# Patient Record
Sex: FEMALE | Race: WHITE | NOT HISPANIC OR LATINO
[De-identification: names, ages, dates, MRNs, and addresses within clinical notes are randomized per-mention and may not be internally consistent; named-entity substitution may affect disease eponyms.]

---

## 2020-01-22 ENCOUNTER — APPOINTMENT (OUTPATIENT)
Dept: HEMATOLOGY ONCOLOGY | Facility: CLINIC | Age: 60
End: 2020-01-22
Payer: COMMERCIAL

## 2020-01-22 ENCOUNTER — LABORATORY RESULT (OUTPATIENT)
Age: 60
End: 2020-01-22

## 2020-01-22 VITALS
HEART RATE: 79 BPM | WEIGHT: 140 LBS | DIASTOLIC BLOOD PRESSURE: 76 MMHG | TEMPERATURE: 98 F | SYSTOLIC BLOOD PRESSURE: 153 MMHG | HEIGHT: 60 IN | RESPIRATION RATE: 16 BRPM | BODY MASS INDEX: 27.48 KG/M2

## 2020-01-22 DIAGNOSIS — F17.200 NICOTINE DEPENDENCE, UNSPECIFIED, UNCOMPLICATED: ICD-10-CM

## 2020-01-22 DIAGNOSIS — I10 ESSENTIAL (PRIMARY) HYPERTENSION: ICD-10-CM

## 2020-01-22 DIAGNOSIS — F41.9 ANXIETY DISORDER, UNSPECIFIED: ICD-10-CM

## 2020-01-22 DIAGNOSIS — Z82.49 FAMILY HISTORY OF ISCHEMIC HEART DISEASE AND OTHER DISEASES OF THE CIRCULATORY SYSTEM: ICD-10-CM

## 2020-01-22 DIAGNOSIS — Z83.3 FAMILY HISTORY OF DIABETES MELLITUS: ICD-10-CM

## 2020-01-22 DIAGNOSIS — E78.5 HYPERLIPIDEMIA, UNSPECIFIED: ICD-10-CM

## 2020-01-22 LAB
HCT VFR BLD CALC: 41.3 %
HGB BLD-MCNC: 13.8 G/DL
IRON SATN MFR SERPL: 25 %
IRON SERPL-MCNC: 84 UG/DL
MCHC RBC-ENTMCNC: 27.9 PG
MCHC RBC-ENTMCNC: 33.4 G/DL
MCV RBC AUTO: 83.4 FL
PLATELET # BLD AUTO: 484 K/UL
PMV BLD: 8.7 FL
RBC # BLD: 4.95 M/UL
RBC # FLD: 13.9 %
RETICS # AUTO: 1 %
RETICS AGGREG/RBC NFR: 48.5 K/UL
TIBC SERPL-MCNC: 331 UG/DL
UIBC SERPL-MCNC: 247 UG/DL
WBC # FLD AUTO: 11.06 K/UL

## 2020-01-22 PROCEDURE — 99205 OFFICE O/P NEW HI 60 MIN: CPT

## 2020-01-22 RX ORDER — CHLORTHALIDONE 25 MG/1
25 TABLET ORAL
Refills: 0 | Status: ACTIVE | COMMUNITY

## 2020-01-22 RX ORDER — LOSARTAN POTASSIUM AND HYDROCHLOROTHIAZIDE 100; 25 MG/1; MG/1
100-25 TABLET, FILM COATED ORAL
Refills: 0 | Status: ACTIVE | COMMUNITY

## 2020-01-22 RX ORDER — ATORVASTATIN CALCIUM 20 MG/1
20 TABLET, FILM COATED ORAL
Refills: 0 | Status: ACTIVE | COMMUNITY

## 2020-01-22 RX ORDER — AMLODIPINE BESYLATE AND BENAZEPRIL HYDROCHLORIDE 10; 20 MG/1; MG/1
10-20 CAPSULE ORAL
Refills: 0 | Status: ACTIVE | COMMUNITY

## 2020-01-23 LAB — FERRITIN SERPL-MCNC: 98 NG/ML

## 2020-01-23 NOTE — ASSESSMENT
[FreeTextEntry1] : 60 yo woman with Thrombocytosis at 500K stable since at least 2009; no evidence of bleeding or thrombosis; no leukocytosis or erythrocytosis\par - CBC, iron studies, ferritin, retic count, BCR-ABL, JAK2/MPL/CALR testing today\par - US Abd ordered to r/o fatty liver \par \par Requested labwork (CBC) dated prior to 2018 for evaluation.\par \par RTC in 1 week

## 2020-01-23 NOTE — REVIEW OF SYSTEMS
[Negative] : Allergic/Immunologic [Chills] : no chills [Fever] : no fever [Shortness Of Breath] : no shortness of breath [Chest Pain] : no chest pain [Vision Problems] : no vision problems [Abdominal Pain] : no abdominal pain [Constipation] : no constipation [Diarrhea] : no diarrhea

## 2020-01-23 NOTE — CONSULT LETTER
[Dear  ___] : Dear  [unfilled], [Sincerely,] : Sincerely, [Please see my note below.] : Please see my note below. [Consult Closing:] : Thank you very much for allowing me to participate in the care of this patient.  If you have any questions, please do not hesitate to contact me. [Consult Letter:] : I had the pleasure of evaluating your patient, [unfilled]. [FreeTextEntry3] : Ab Gramajo DO\par Attending Physician,\par Hematology/ Medical Oncology\par [phone] office\par \par  [FreeTextEntry2] : David Kraft Dr

## 2020-01-23 NOTE — HISTORY OF PRESENT ILLNESS
[de-identified] : Ms. Siena Bagley is a 59 year old female here today for evaluation of high PLTs.\par \par She was referred by Dr. Kraft.  Patient is a 58yo F with PMH including htn and anxiety who presents for thrombocytosis.  She states she has been evaluated in their office for about 15 years but this is the first time she was made aware of the elevated PLTs.  Patient denies any new headaches, CP, pruritus, changes in vision or bleeding.  \par \par LAB WORKUP\par (12.7.2019) WBC 8.4, Hgb 14.4, MCV 84.7, RDW 13.1, , , HDL 67, normal CMP,  A1c 5.4%\par (03.02.2018) WBC 7.8, Hgb 14.1, MCV 86.2, RDW 14, \par \par reviewed the labs from 2009: platelets count was 484K AND no anemia or leukopenia

## 2020-01-27 LAB — GENE XXX MUT ANL BLD/T: NORMAL

## 2020-01-28 LAB — T(9;22)(ABL1,BCR)/CONTROL BLD/T: NORMAL

## 2020-01-30 LAB
COMMENT: NORMAL
MPL P.S505N BLD/T QL: NORMAL
MPL P.W515 BLD/T QL: NORMAL
MPL SOURCE: NOT DETECTED

## 2020-01-31 ENCOUNTER — APPOINTMENT (OUTPATIENT)
Dept: HEMATOLOGY ONCOLOGY | Facility: CLINIC | Age: 60
End: 2020-01-31
Payer: MEDICAID

## 2020-01-31 VITALS
WEIGHT: 143 LBS | HEART RATE: 78 BPM | BODY MASS INDEX: 26.31 KG/M2 | HEIGHT: 62 IN | TEMPERATURE: 97.6 F | SYSTOLIC BLOOD PRESSURE: 137 MMHG | RESPIRATION RATE: 16 BRPM | DIASTOLIC BLOOD PRESSURE: 64 MMHG

## 2020-01-31 PROCEDURE — 99214 OFFICE O/P EST MOD 30 MIN: CPT

## 2020-01-31 NOTE — HISTORY OF PRESENT ILLNESS
[de-identified] : Ms. Siena Bagley is a 59 year old female here today for evaluation of high PLTs.\par \par She was referred by Dr. Kraft.  Patient is a 58yo F with PMH including htn and anxiety who presents for thrombocytosis.  She states she has been evaluated in their office for about 15 years but this is the first time she was made aware of the elevated PLTs.  Patient denies any new headaches, CP, pruritus, changes in vision or bleeding.  \par \par LAB WORKUP\par (12.7.2019) WBC 8.4, Hgb 14.4, MCV 84.7, RDW 13.1, , , HDL 67, normal CMP,  A1c 5.4%\par (03.02.2018) WBC 7.8, Hgb 14.1, MCV 86.2, RDW 14, \par \par reviewed the labs from 2009: platelets count was 484K AND no anemia or leukopenia [de-identified] : 1/31/2020\par

## 2020-01-31 NOTE — CONSULT LETTER
[Dear  ___] : Dear  [unfilled], [Consult Letter:] : I had the pleasure of evaluating your patient, [unfilled]. [Please see my note below.] : Please see my note below. [Consult Closing:] : Thank you very much for allowing me to participate in the care of this patient.  If you have any questions, please do not hesitate to contact me. [Sincerely,] : Sincerely, [FreeTextEntry2] : David Kraft Dr [FreeTextEntry3] : Ab Gramajo DO\par Attending Physician,\par Hematology/ Medical Oncology\par [phone] office\par \par

## 2020-01-31 NOTE — ASSESSMENT
[FreeTextEntry1] : 60 yo woman with Thrombocytosis at 500K stable since at least 2009; no evidence of bleeding or thrombosis; no leukocytosis or erythrocytosis- \par bcr abl not detected\par calr and jak2 neg\par MPL pending\par - US Abd ordered to r/o fatty liver pending\par \par there is a possibility of triple negative (JAK2/ MPL/ CALR) Essential thrombocytosis; if that is the case , she has intermidiate risk of progression to leukemia, but does not yet require hydrea; will keep monitoring levels; start ASA; will get vWD w/u\par \par no iron deficiency to explain thrombocytosis\par \par Will try to r/o MDS w/ 5q deletion by blood w/u; otherwise will consider bone marrow\par \par RTC in 4  weeks

## 2020-01-31 NOTE — REVIEW OF SYSTEMS
[Negative] : Allergic/Immunologic [Fever] : no fever [Chills] : no chills [Vision Problems] : no vision problems [Chest Pain] : no chest pain [Shortness Of Breath] : no shortness of breath [Abdominal Pain] : no abdominal pain [Constipation] : no constipation [Diarrhea] : no diarrhea

## 2020-02-03 LAB — VWF AG PPP IA-ACNC: 100 %

## 2020-02-12 LAB
VWF MULTIMERS PPP IA-ACNC: NORMAL
VWF:RCO ACT/NOR PPP PL AGG: 82 %

## 2020-02-21 ENCOUNTER — APPOINTMENT (OUTPATIENT)
Dept: HEMATOLOGY ONCOLOGY | Facility: CLINIC | Age: 60
End: 2020-02-21
Payer: MEDICAID

## 2020-02-21 ENCOUNTER — OUTPATIENT (OUTPATIENT)
Dept: OUTPATIENT SERVICES | Facility: HOSPITAL | Age: 60
LOS: 1 days | Discharge: HOME | End: 2020-02-21

## 2020-02-21 ENCOUNTER — LABORATORY RESULT (OUTPATIENT)
Age: 60
End: 2020-02-21

## 2020-02-21 VITALS
SYSTOLIC BLOOD PRESSURE: 147 MMHG | BODY MASS INDEX: 26.68 KG/M2 | HEIGHT: 62 IN | WEIGHT: 145 LBS | HEART RATE: 72 BPM | DIASTOLIC BLOOD PRESSURE: 73 MMHG | TEMPERATURE: 97.6 F

## 2020-02-21 DIAGNOSIS — D47.3 ESSENTIAL (HEMORRHAGIC) THROMBOCYTHEMIA: ICD-10-CM

## 2020-02-21 LAB
HCT VFR BLD CALC: 38.1 %
HGB BLD-MCNC: 13 G/DL
MCHC RBC-ENTMCNC: 28.7 PG
MCHC RBC-ENTMCNC: 34.1 G/DL
MCV RBC AUTO: 84.1 FL
PLATELET # BLD AUTO: 413 K/UL
PMV BLD: 9.2 FL
RBC # BLD: 4.53 M/UL
RBC # FLD: 14.1 %
WBC # FLD AUTO: 13.01 K/UL

## 2020-02-21 PROCEDURE — 99213 OFFICE O/P EST LOW 20 MIN: CPT

## 2020-02-21 NOTE — REVIEW OF SYSTEMS
[Negative] : Heme/Lymph [Fever] : no fever [Chills] : no chills [Vision Problems] : no vision problems [Chest Pain] : no chest pain [Shortness Of Breath] : no shortness of breath [Abdominal Pain] : no abdominal pain [Constipation] : no constipation [Diarrhea] : no diarrhea

## 2020-02-21 NOTE — HISTORY OF PRESENT ILLNESS
[de-identified] : Ms. Siena Bagley is a 59 year old female here today for evaluation of high PLTs.\par \par She was referred by Dr. Kraft.  Patient is a 60yo F with PMH including htn and anxiety who presents for thrombocytosis.  She states she has been evaluated in their office for about 15 years but this is the first time she was made aware of the elevated PLTs.  Patient denies any new headaches, CP, pruritus, changes in vision or bleeding.  \par \par LAB WORKUP\par (12.7.2019) WBC 8.4, Hgb 14.4, MCV 84.7, RDW 13.1, , , HDL 67, normal CMP,  A1c 5.4%\par (03.02.2018) WBC 7.8, Hgb 14.1, MCV 86.2, RDW 14, \par \par reviewed the labs from 2009: platelets count was 484K AND no anemia or leukopenia [de-identified] : 2/21/2020\par Patient is here for a follow-up visit for thrombocytosis.  She is feeling well with no new complaints.  Reviewed most recent CBC shows persistent leukocytosis at 13.01 and PLTs at 413,000 from today.  Patient denies fever, chills, nausea, vomiting, new CP or bleeding.  US Abd was not yet performed.  She takes baby ASA daily.

## 2020-02-21 NOTE — CONSULT LETTER
[Consult Letter:] : I had the pleasure of evaluating your patient, [unfilled]. [Dear  ___] : Dear  [unfilled], [Please see my note below.] : Please see my note below. [Sincerely,] : Sincerely, [Consult Closing:] : Thank you very much for allowing me to participate in the care of this patient.  If you have any questions, please do not hesitate to contact me. [FreeTextEntry2] : David Kraft Dr [FreeTextEntry3] : Ab Gramajo DO\par Attending Physician,\par Hematology/ Medical Oncology\par [phone] office\par \par

## 2020-02-24 ENCOUNTER — LABORATORY RESULT (OUTPATIENT)
Age: 60
End: 2020-02-24

## 2020-02-29 ENCOUNTER — FORM ENCOUNTER (OUTPATIENT)
Age: 60
End: 2020-02-29

## 2020-03-01 ENCOUNTER — OUTPATIENT (OUTPATIENT)
Dept: OUTPATIENT SERVICES | Facility: HOSPITAL | Age: 60
LOS: 1 days | Discharge: HOME | End: 2020-03-01
Payer: COMMERCIAL

## 2020-03-01 DIAGNOSIS — D47.3 ESSENTIAL (HEMORRHAGIC) THROMBOCYTHEMIA: ICD-10-CM

## 2020-03-01 DIAGNOSIS — R10.9 UNSPECIFIED ABDOMINAL PAIN: ICD-10-CM

## 2020-03-01 PROCEDURE — 76700 US EXAM ABDOM COMPLETE: CPT | Mod: 26

## 2020-04-22 ENCOUNTER — APPOINTMENT (OUTPATIENT)
Dept: HEMATOLOGY ONCOLOGY | Facility: CLINIC | Age: 60
End: 2020-04-22

## 2020-04-23 ENCOUNTER — APPOINTMENT (OUTPATIENT)
Dept: HEMATOLOGY ONCOLOGY | Facility: CLINIC | Age: 60
End: 2020-04-23
Payer: MEDICAID

## 2020-04-23 PROCEDURE — 99213 OFFICE O/P EST LOW 20 MIN: CPT | Mod: 95

## 2020-04-23 NOTE — REVIEW OF SYSTEMS
[Negative] : Allergic/Immunologic [Fever] : no fever [Chills] : no chills [Chest Pain] : no chest pain [Vision Problems] : no vision problems [Abdominal Pain] : no abdominal pain [Constipation] : no constipation [Shortness Of Breath] : no shortness of breath [Diarrhea] : no diarrhea

## 2020-04-23 NOTE — PHYSICAL EXAM
[Fully active, able to carry on all pre-disease performance without restriction] : Status 0 - Fully active, able to carry on all pre-disease performance without restriction [Normal] : grossly intact [de-identified] : telehealth visit; daughter is present

## 2020-04-23 NOTE — HISTORY OF PRESENT ILLNESS
[de-identified] : Ms. Siena Bagley is a 59 year old female here today for evaluation of high PLTs.\par \par She was referred by Dr. Kraft.  Patient is a 58yo F with PMH including htn and anxiety who presents for thrombocytosis.  She states she has been evaluated in their office for about 15 years but this is the first time she was made aware of the elevated PLTs.  Patient denies any new headaches, CP, pruritus, changes in vision or bleeding.  \par \par LAB WORKUP\par (12.7.2019) WBC 8.4, Hgb 14.4, MCV 84.7, RDW 13.1, , , HDL 67, normal CMP,  A1c 5.4%\par (03.02.2018) WBC 7.8, Hgb 14.1, MCV 86.2, RDW 14, \par \par reviewed the labs from 2009: platelets count was 484K AND no anemia or leukopenia [de-identified] : 2/21/2020\par Patient is here for a follow-up visit for thrombocytosis.  She is feeling well with no new complaints.  Reviewed most recent CBC shows persistent leukocytosis at 13.01 and PLTs at 413,000 from today.  Patient denies fever, chills, nausea, vomiting, new CP or bleeding.  US Abd was not yet performed.  She takes baby ASA daily.

## 2020-04-23 NOTE — ASSESSMENT
[FreeTextEntry1] : TELEHEALTH VISIT\par 60 yo woman with Thrombocytosis at 500K stable since at least 2009; no evidence of bleeding or thrombosis; no leukocytosis or erythrocytosis; intermediate risk based on age >60, no JAK2 expression or thrombosis\par bcr-abl not detected; calr and jak2 neg; MPL negative\par - US Abd ordered : NO FATTY LIVER; no spelnomegaly\par - there is a possibility of triple negative (JAK2/ MPL/ CALR) Essential thrombocytosis; if that is the case , she has intermediate risk of progression to leukemia, but does not yet require hydrea\par - will keep monitoring levels\par - c/w baby ASA daily\par - no labs were done as she is afraid to get exposed to covid; rtc in 3 mos\par no iron deficiency to explain thrombocytosis\par \par Will try to r/o MDS w/ 5q deletion by blood w/u; not done with previous labs; otherwise will consider bone marrow\par ultrasound of abdomen\par

## 2020-04-23 NOTE — CONSULT LETTER
[Consult Closing:] : Thank you very much for allowing me to participate in the care of this patient.  If you have any questions, please do not hesitate to contact me. [Consult Letter:] : I had the pleasure of evaluating your patient, [unfilled]. [Please see my note below.] : Please see my note below. [Dear  ___] : Dear  [unfilled], [Sincerely,] : Sincerely, [FreeTextEntry3] : Ab Gramajo DO\par Attending Physician,\par Hematology/ Medical Oncology\par [phone] office\par \par  [FreeTextEntry2] : David Kraft Dr

## 2020-07-17 ENCOUNTER — LABORATORY RESULT (OUTPATIENT)
Age: 60
End: 2020-07-17

## 2020-07-17 ENCOUNTER — APPOINTMENT (OUTPATIENT)
Dept: HEMATOLOGY ONCOLOGY | Facility: CLINIC | Age: 60
End: 2020-07-17

## 2020-07-17 DIAGNOSIS — Z00.00 ENCOUNTER FOR GENERAL ADULT MEDICAL EXAMINATION W/OUT ABNORMAL FINDINGS: ICD-10-CM

## 2020-07-17 LAB
ALBUMIN SERPL ELPH-MCNC: 4.4 G/DL
ALP BLD-CCNC: 76 U/L
ALT SERPL-CCNC: 13 U/L
ANION GAP SERPL CALC-SCNC: 12 MMOL/L
AST SERPL-CCNC: 15 U/L
BILIRUB DIRECT SERPL-MCNC: <0.2 MG/DL
BILIRUB INDIRECT SERPL-MCNC: >0.2 MG/DL
BILIRUB SERPL-MCNC: 0.4 MG/DL
BUN SERPL-MCNC: 15 MG/DL
CALCIUM SERPL-MCNC: 9.3 MG/DL
CHLORIDE SERPL-SCNC: 102 MMOL/L
CO2 SERPL-SCNC: 26 MMOL/L
CREAT SERPL-MCNC: 0.7 MG/DL
GLUCOSE SERPL-MCNC: 90 MG/DL
HCT VFR BLD CALC: 41.3 %
HGB BLD-MCNC: 14 G/DL
MCHC RBC-ENTMCNC: 28.9 PG
MCHC RBC-ENTMCNC: 33.9 G/DL
MCV RBC AUTO: 85.2 FL
PLATELET # BLD AUTO: 415 K/UL
PMV BLD: 8.4 FL
POTASSIUM SERPL-SCNC: 4.6 MMOL/L
PROT SERPL-MCNC: 7.3 G/DL
RBC # BLD: 4.85 M/UL
RBC # FLD: 13.8 %
SODIUM SERPL-SCNC: 140 MMOL/L
WBC # FLD AUTO: 11.76 K/UL

## 2020-07-23 ENCOUNTER — LABORATORY RESULT (OUTPATIENT)
Age: 60
End: 2020-07-23

## 2020-07-24 ENCOUNTER — RESULT REVIEW (OUTPATIENT)
Age: 60
End: 2020-07-24

## 2020-07-24 ENCOUNTER — APPOINTMENT (OUTPATIENT)
Dept: HEMATOLOGY ONCOLOGY | Facility: CLINIC | Age: 60
End: 2020-07-24
Payer: MEDICAID

## 2020-07-24 ENCOUNTER — OUTPATIENT (OUTPATIENT)
Dept: OUTPATIENT SERVICES | Facility: HOSPITAL | Age: 60
LOS: 1 days | Discharge: HOME | End: 2020-07-24
Payer: MEDICAID

## 2020-07-24 VITALS
SYSTOLIC BLOOD PRESSURE: 149 MMHG | BODY MASS INDEX: 28.52 KG/M2 | DIASTOLIC BLOOD PRESSURE: 71 MMHG | HEART RATE: 71 BPM | WEIGHT: 155 LBS | HEIGHT: 62 IN | RESPIRATION RATE: 16 BRPM | TEMPERATURE: 96.9 F

## 2020-07-24 DIAGNOSIS — D47.3 ESSENTIAL (HEMORRHAGIC) THROMBOCYTHEMIA: ICD-10-CM

## 2020-07-24 PROCEDURE — 99213 OFFICE O/P EST LOW 20 MIN: CPT

## 2020-07-24 PROCEDURE — 93970 EXTREMITY STUDY: CPT | Mod: 26

## 2020-07-24 NOTE — PHYSICAL EXAM
[Fully active, able to carry on all pre-disease performance without restriction] : Status 0 - Fully active, able to carry on all pre-disease performance without restriction [Normal] : affect appropriate [de-identified] : telehealth visit; daughter is present

## 2020-07-24 NOTE — CONSULT LETTER
[Dear  ___] : Dear  [unfilled], [Consult Letter:] : I had the pleasure of evaluating your patient, [unfilled]. [Please see my note below.] : Please see my note below. [Consult Closing:] : Thank you very much for allowing me to participate in the care of this patient.  If you have any questions, please do not hesitate to contact me. [Sincerely,] : Sincerely, [FreeTextEntry2] : David Kraft Dr [FreeTextEntry3] : Ab Gramajo DO\par Attending Physician,\par Hematology/ Medical Oncology\par [phone] office\par \par

## 2020-07-24 NOTE — REVIEW OF SYSTEMS
[Negative] : Allergic/Immunologic [Fever] : no fever [Vision Problems] : no vision problems [Chills] : no chills [Abdominal Pain] : no abdominal pain [Shortness Of Breath] : no shortness of breath [Chest Pain] : no chest pain [Diarrhea] : no diarrhea [Constipation] : no constipation

## 2020-07-24 NOTE — HISTORY OF PRESENT ILLNESS
[Cardiovascular] : Cardiovascular [Constitutional] : Constitutional [ENT] : ENT [Dermatologic] : Dermatologic [Endocrine] : Endocrine [Gastrointestinal] : Gastrointestinal [Genitourinary] : Genitourinary [Gynecologic] : Gynecologic [Neurologic] : Neurologic [Musculoskeletal] : Musculoskeletal [Infectious] : Infectious [Pulmonary] : Pulmonary [Pain] : Pain [Hematologic] : Hematologic [de-identified] : Ms. Siena Bagley is a 59 year old female here today for evaluation of high PLTs.\par \par She was referred by Dr. Kraft.  Patient is a 58yo F with PMH including htn and anxiety who presents for thrombocytosis.  She states she has been evaluated in their office for about 15 years but this is the first time she was made aware of the elevated PLTs.  Patient denies any new headaches, CP, pruritus, changes in vision or bleeding.  \par \par LAB WORKUP\par (12.7.2019) WBC 8.4, Hgb 14.4, MCV 84.7, RDW 13.1, , , HDL 67, normal CMP,  A1c 5.4%\par (03.02.2018) WBC 7.8, Hgb 14.1, MCV 86.2, RDW 14, \par \par reviewed the labs from 2009: platelets count was 484K AND no anemia or leukopenia [de-identified] : 2/21/2020\par Patient is here for a follow-up visit for thrombocytosis.  She is feeling well with no new complaints.  Reviewed most recent CBC shows persistent leukocytosis at 13.01 and PLTs at 413,000 from today.  Patient denies fever, chills, nausea, vomiting, new CP or bleeding.  US Abd was not yet performed.  She takes baby ASA daily.  \par \par 7/24/2020\par Patient is here for a follow-up visit for thrombocytosis.  She is feeling well with no new complaints.  Reviewed most recent CBC from last week shows persistent leukocytosis at 11.76 and PLTs at 415,000 from today.  Patient denies fever, chills, nausea, acute changes in vision, new CP or bleeding.  US Abd reviewed from March 2020 and was normal.  She takes baby ASA daily.   She states she developed nonerythematous right calf pain, worse when walking, which she went to urgent care and they instructed her to go to ER but she did not.  She states she applied ice and says the swelling improved slightly but not completely.  \par US Abd (3.3.2020) IMPRESSION: \par Unremarkable abdominal ultrasound.\par \par EXAM:  US DPLX LWR EXT VEINS COMPL BI      \par \par \par PROCEDURE DATE:  07/24/2020  \par \par \par \par \par INTERPRETATION:  CLINICAL HISTORY / REASON FOR EXAM: Right leg swelling and pain.\par \par TECHNIQUE: Sonographic evaluation of the bilateral lower extremity deep venous system was performed using gray scale, color doppler, and augmentation.\par \par COMPARISON: None.\par \par FINDINGS:\par The bilateral common femoral, greater saphenous, femoral, and popliteal veins were visualized with no evidence of deep venous thrombosis.\par \par All veins were fully compressible. There was presence of spontaneous flow, augmentation with distal compression and phasicity. \par \par The anterior tibial, posterior tibial and peroneal veins were patent.\par \par Right popliteal fossa cyst measuring 2.6 x 2.0 x 0.6 cm with internal echoes, which represent hemorrhage or debris.\par \par IMPRESSION:\par \par No evidence of deep venous thrombosis in either lower extremity. \par \par Right popliteal fossa cyst measuring 2.6 x 2.0 x 0.6 cm with internal echoes, which may represent hemorrhage or debris.\par \par \par \par \par \par \par MORA CORDOVA M.D., ATTENDING RADIOLOGIST\par This document has been electronically signed. Jul 24 2020  1:32PM\par   \par \par   \par \par \par [id number]^RI^DC\par \par \par

## 2020-07-24 NOTE — ASSESSMENT
[FreeTextEntry1] : 60 yo woman with Thrombocytosis at 500K stable since at least 2009; no evidence of bleeding or thrombosis; no leukocytosis or erythrocytosis; intermediate risk based on age >60, no JAK2 expression or thrombosis\par bcr-abl not detected; calr and jak2 neg; MPL negative.  no iron deficiency to explain thrombocytosis\par - US Abd ordered : NO FATTY LIVER; no splenomegaly\par - there is a possibility of triple negative (JAK2/ MPL/ CALR) Essential thrombocytosis; if that is the case , she has intermediate risk of progression to leukemia, but does not yet require hydrea\par - will keep monitoring levels\par - will send FISH to r/o MDS w/ 5q deletion by blood w/u; otherwise will consider bone marrow if counts worsen\par - c/w baby ASA daily\par \par #R calf pain and swelling, worse when walking; onset 2 days ago\par - she was instructed to go to ER yesterday by Urgent Care\par -SONO of legs showed a cyst in popliteal fossa: referred to [facility] stat; discussed w pMD\par \par \par \par RTC in 2 months

## 2020-09-25 ENCOUNTER — LABORATORY RESULT (OUTPATIENT)
Age: 60
End: 2020-09-25

## 2020-09-25 ENCOUNTER — APPOINTMENT (OUTPATIENT)
Dept: HEMATOLOGY ONCOLOGY | Facility: CLINIC | Age: 60
End: 2020-09-25
Payer: COMMERCIAL

## 2020-09-25 ENCOUNTER — OUTPATIENT (OUTPATIENT)
Dept: OUTPATIENT SERVICES | Facility: HOSPITAL | Age: 60
LOS: 1 days | Discharge: HOME | End: 2020-09-25
Payer: MEDICAID

## 2020-09-25 VITALS
HEART RATE: 71 BPM | DIASTOLIC BLOOD PRESSURE: 83 MMHG | TEMPERATURE: 97.1 F | WEIGHT: 154 LBS | HEIGHT: 62 IN | BODY MASS INDEX: 28.34 KG/M2 | SYSTOLIC BLOOD PRESSURE: 147 MMHG | RESPIRATION RATE: 16 BRPM

## 2020-09-25 DIAGNOSIS — D47.3 ESSENTIAL (HEMORRHAGIC) THROMBOCYTHEMIA: ICD-10-CM

## 2020-09-25 LAB
HCT VFR BLD CALC: 40.1 %
HGB BLD-MCNC: 13.8 G/DL
MCHC RBC-ENTMCNC: 28.9 PG
MCHC RBC-ENTMCNC: 34.4 G/DL
MCV RBC AUTO: 84.1 FL
PLATELET # BLD AUTO: 363 K/UL
PMV BLD: 9.1 FL
RBC # BLD: 4.77 M/UL
RBC # FLD: 13.7 %
WBC # FLD AUTO: 10.29 K/UL

## 2020-09-25 PROCEDURE — 88189 FLOWCYTOMETRY/READ 16 & >: CPT

## 2020-09-25 PROCEDURE — 99213 OFFICE O/P EST LOW 20 MIN: CPT

## 2020-09-25 NOTE — ASSESSMENT
[FreeTextEntry1] : 59 yo woman with Thrombocytosis at 500K stable since at least 2009; no evidence of bleeding or thrombosis; no leukocytosis or erythrocytosis; intermediate risk based on age >60, no JAK2 expression or thrombosis\par bcr-abl not detected; calr and jak2 neg; MPL negative.  no iron deficiency to explain thrombocytosis\par - US Abd ordered : NO FATTY LIVER; no splenomegaly\par - there is a possibility of triple negative (JAK2/ MPL/ CALR) Essential thrombocytosis; if that is the case , she has intermediate risk of progression to leukemia, but does not yet require hydrea\par - will keep monitoring levels\par - will send FISH to r/o MDS w/ 5q deletion by blood w/u; otherwise will consider bone marrow if counts worsen\par - c/w baby ASA daily\par \par #R calf pain and swelling, worse when walking; onset 2 days ago\par - she was instructed to go to ER yesterday by Urgent Care\par -SONO of legs showed a cyst in popliteal fossa: referred to [facility]ho stat; discussed w pMD\par \par \par plats count normalized\par RTC in 2 months

## 2020-09-25 NOTE — CONSULT LETTER
[Dear  ___] : Dear  [unfilled], [Consult Letter:] : I had the pleasure of evaluating your patient, [unfilled]. [Please see my note below.] : Please see my note below. [Consult Closing:] : Thank you very much for allowing me to participate in the care of this patient.  If you have any questions, please do not hesitate to contact me. [Sincerely,] : Sincerely, [FreeTextEntry2] : David Kraft Dr [FreeTextEntry3] : Ab Gramajo DO\par Attending Physician,\par Hematology/ Medical Oncology\par [phone] office\par \par

## 2020-09-25 NOTE — HISTORY OF PRESENT ILLNESS
[Cardiovascular] : Cardiovascular [Constitutional] : Constitutional [ENT] : ENT [Dermatologic] : Dermatologic [Endocrine] : Endocrine [Gastrointestinal] : Gastrointestinal [Genitourinary] : Genitourinary [Gynecologic] : Gynecologic [Infectious] : Infectious [Musculoskeletal] : Musculoskeletal [Neurologic] : Neurologic [Pain] : Pain [Pulmonary] : Pulmonary [Hematologic] : Hematologic [de-identified] : Ms. Siena Bagley is a 59 year old female here today for evaluation of high PLTs.\par \par She was referred by Dr. Kraft.  Patient is a 58yo F with PMH including htn and anxiety who presents for thrombocytosis.  She states she has been evaluated in their office for about 15 years but this is the first time she was made aware of the elevated PLTs.  Patient denies any new headaches, CP, pruritus, changes in vision or bleeding.  \par \par LAB WORKUP\par (12.7.2019) WBC 8.4, Hgb 14.4, MCV 84.7, RDW 13.1, , , HDL 67, normal CMP,  A1c 5.4%\par (03.02.2018) WBC 7.8, Hgb 14.1, MCV 86.2, RDW 14, \par \par reviewed the labs from 2009: platelets count was 484K AND no anemia or leukopenia [de-identified] : 2/21/2020\par Patient is here for a follow-up visit for thrombocytosis.  She is feeling well with no new complaints.  Reviewed most recent CBC shows persistent leukocytosis at 13.01 and PLTs at 413,000 from today.  Patient denies fever, chills, nausea, vomiting, new CP or bleeding.  US Abd was not yet performed.  She takes baby ASA daily.  \par \par 7/24/2020\par Patient is here for a follow-up visit for thrombocytosis.  She is feeling well with no new complaints.  Reviewed most recent CBC from last week shows persistent leukocytosis at 11.76 and PLTs at 415,000 from today.  Patient denies fever, chills, nausea, acute changes in vision, new CP or bleeding.  US Abd reviewed from March 2020 and was normal.  She takes baby ASA daily.   She states she developed nonerythematous right calf pain, worse when walking, which she went to urgent care and they instructed her to go to ER but she did not.  She states she applied ice and says the swelling improved slightly but not completely.  \par US Abd (3.3.2020) IMPRESSION: \par Unremarkable abdominal ultrasound.\par \par EXAM:  US DPLX LWR EXT VEINS COMPL BI      \par \par \par PROCEDURE DATE:  07/24/2020  \par \par \par \par \par INTERPRETATION:  CLINICAL HISTORY / REASON FOR EXAM: Right leg swelling and pain.\par \par TECHNIQUE: Sonographic evaluation of the bilateral lower extremity deep venous system was performed using gray scale, color doppler, and augmentation.\par \par COMPARISON: None.\par \par FINDINGS:\par The bilateral common femoral, greater saphenous, femoral, and popliteal veins were visualized with no evidence of deep venous thrombosis.\par \par All veins were fully compressible. There was presence of spontaneous flow, augmentation with distal compression and phasicity. \par \par The anterior tibial, posterior tibial and peroneal veins were patent.\par \par Right popliteal fossa cyst measuring 2.6 x 2.0 x 0.6 cm with internal echoes, which represent hemorrhage or debris.\par \par IMPRESSION:\par \par No evidence of deep venous thrombosis in either lower extremity. \par \par Right popliteal fossa cyst measuring 2.6 x 2.0 x 0.6 cm with internal echoes, which may represent hemorrhage or debris.\par \par \par \par \par \par \par MORA CORDOVA M.D., ATTENDING RADIOLOGIST\par This document has been electronically signed. Jul 24 2020  1:32PM\par   \par \par   \par \par \par [id number]^RI^DC\par \par \par

## 2020-09-25 NOTE — PHYSICAL EXAM
[Fully active, able to carry on all pre-disease performance without restriction] : Status 0 - Fully active, able to carry on all pre-disease performance without restriction [Normal] : affect appropriate [de-identified] : telehealth visit; daughter is present

## 2021-03-26 ENCOUNTER — OUTPATIENT (OUTPATIENT)
Dept: OUTPATIENT SERVICES | Facility: HOSPITAL | Age: 61
LOS: 1 days | Discharge: HOME | End: 2021-03-26

## 2021-03-26 ENCOUNTER — LABORATORY RESULT (OUTPATIENT)
Age: 61
End: 2021-03-26

## 2021-03-26 ENCOUNTER — APPOINTMENT (OUTPATIENT)
Dept: HEMATOLOGY ONCOLOGY | Facility: CLINIC | Age: 61
End: 2021-03-26
Payer: COMMERCIAL

## 2021-03-26 VITALS
HEIGHT: 62 IN | TEMPERATURE: 97.2 F | DIASTOLIC BLOOD PRESSURE: 65 MMHG | RESPIRATION RATE: 16 BRPM | WEIGHT: 151 LBS | SYSTOLIC BLOOD PRESSURE: 125 MMHG | BODY MASS INDEX: 27.79 KG/M2 | HEART RATE: 70 BPM

## 2021-03-26 LAB
HCT VFR BLD CALC: 40 %
HGB BLD-MCNC: 13.7 G/DL
MCHC RBC-ENTMCNC: 28.8 PG
MCHC RBC-ENTMCNC: 34.3 G/DL
MCV RBC AUTO: 84 FL
PLATELET # BLD AUTO: 391 K/UL
PMV BLD: 9.1 FL
RBC # BLD: 4.76 M/UL
RBC # FLD: 13.3 %
WBC # FLD AUTO: 11.01 K/UL

## 2021-03-26 PROCEDURE — 99212 OFFICE O/P EST SF 10 MIN: CPT

## 2021-03-26 NOTE — PHYSICAL EXAM
[Fully active, able to carry on all pre-disease performance without restriction] : Status 0 - Fully active, able to carry on all pre-disease performance without restriction [Normal] : affect appropriate [de-identified] : telehealth visit; daughter is present

## 2021-03-26 NOTE — CONSULT LETTER
[Dear  ___] : Dear  [unfilled], [Consult Letter:] : I had the pleasure of evaluating your patient, [unfilled]. [Please see my note below.] : Please see my note below. [Consult Closing:] : Thank you very much for allowing me to participate in the care of this patient.  If you have any questions, please do not hesitate to contact me. [Sincerely,] : Sincerely, [FreeTextEntry2] : David Kraft Dr [FreeTextEntry3] : Ab Gramajo DO\par Attending Physician,\par Hematology/ Medical Oncology\par [phone] office\par \par

## 2021-03-26 NOTE — ASSESSMENT
[FreeTextEntry1] : 59 yo woman with Thrombocytosis at 500K stable since at least 2009; no evidence of bleeding or thrombosis; no leukocytosis or erythrocytosis; intermediate risk based on age >60, no JAK2 expression or thrombosis\par bcr-abl not detected; calr and jak2 neg; MPL negative.  no iron deficiency to explain thrombocytosis\par - US Abd ordered : NO FATTY LIVER; no splenomegaly\par - there is a possibility of triple negative (JAK2/ MPL/ CALR) Essential thrombocytosis; if that is the case , she has intermediate risk of progression to leukemia, but does not yet require hydrea\par - FISH to r/o MDS w/ 5q deletion by blood showed no evidence of abnormality\par - PLTs normal today at 391,000\par - c/w baby ASA daily\par \par #R calf pain and swelling, worse when walking; onset 2 days ago\par - she was instructed to go to ER yesterday by Urgent Care\par - SONO of legs showed a cyst in popliteal fossa: referred to orho stat; discussed w pMD\par \par Will continue to monitor slight leukocytosis, she is asymptomatic.  \par \par RTC in 4 months with CBC, sooner if needed

## 2021-03-26 NOTE — HISTORY OF PRESENT ILLNESS
[Cardiovascular] : Cardiovascular [Constitutional] : Constitutional [ENT] : ENT [Dermatologic] : Dermatologic [Endocrine] : Endocrine [Gastrointestinal] : Gastrointestinal [Genitourinary] : Genitourinary [Gynecologic] : Gynecologic [Infectious] : Infectious [Musculoskeletal] : Musculoskeletal [Neurologic] : Neurologic [Pain] : Pain [Pulmonary] : Pulmonary [Hematologic] : Hematologic [de-identified] : Ms. Siena Bagley is a 59 year old female here today for evaluation of high PLTs.\par \par She was referred by Dr. Kraft.  Patient is a 58yo F with PMH including htn and anxiety who presents for thrombocytosis.  She states she has been evaluated in their office for about 15 years but this is the first time she was made aware of the elevated PLTs.  Patient denies any new headaches, CP, pruritus, changes in vision or bleeding.  \par \par LAB WORKUP\par (12.7.2019) WBC 8.4, Hgb 14.4, MCV 84.7, RDW 13.1, , , HDL 67, normal CMP,  A1c 5.4%\par (03.02.2018) WBC 7.8, Hgb 14.1, MCV 86.2, RDW 14, \par \par reviewed the labs from 2009: platelets count was 484K AND no anemia or leukopenia [de-identified] : 2/21/2020\par Patient is here for a follow-up visit for thrombocytosis.  She is feeling well with no new complaints.  Reviewed most recent CBC shows persistent leukocytosis at 13.01 and PLTs at 413,000 from today.  Patient denies fever, chills, nausea, vomiting, new CP or bleeding.  US Abd was not yet performed.  She takes baby ASA daily.  \par \par 7/24/2020\par Patient is here for a follow-up visit for thrombocytosis.  She is feeling well with no new complaints.  Reviewed most recent CBC from last week shows persistent leukocytosis at 11.76 and PLTs at 415,000 from today.  Patient denies fever, chills, nausea, acute changes in vision, new CP or bleeding.  US Abd reviewed from March 2020 and was normal.  She takes baby ASA daily.   She states she developed nonerythematous right calf pain, worse when walking, which she went to urgent care and they instructed her to go to ER but she did not.  She states she applied ice and says the swelling improved slightly but not completely.  \par US Abd (3.3.2020) IMPRESSION: \par Unremarkable abdominal ultrasound.\par \par EXAM:  US DPLX LWR EXT VEINS COMPL BI      \par \par \par PROCEDURE DATE:  07/24/2020  \par \par \par \par \par INTERPRETATION:  CLINICAL HISTORY / REASON FOR EXAM: Right leg swelling and pain.\par \par TECHNIQUE: Sonographic evaluation of the bilateral lower extremity deep venous system was performed using gray scale, color doppler, and augmentation.\par \par COMPARISON: None.\par \par FINDINGS:\par The bilateral common femoral, greater saphenous, femoral, and popliteal veins were visualized with no evidence of deep venous thrombosis.\par \par All veins were fully compressible. There was presence of spontaneous flow, augmentation with distal compression and phasicity. \par \par The anterior tibial, posterior tibial and peroneal veins were patent.\par \par Right popliteal fossa cyst measuring 2.6 x 2.0 x 0.6 cm with internal echoes, which represent hemorrhage or debris.\par \par IMPRESSION:\par \par No evidence of deep venous thrombosis in either lower extremity. \par \par Right popliteal fossa cyst measuring 2.6 x 2.0 x 0.6 cm with internal echoes, which may represent hemorrhage or debris.\par \par \par \par \par \par \par MORA CORDOVA M.D., ATTENDING RADIOLOGIST\par This document has been electronically signed. Jul 24 2020  1:32PM\par   \par \par   \par \par \par [id number]^[place]^DC\par \par \par 3/26/21\par Patient is here for a follow-up visit for thrombocytosis.  She is feeling well with no new complaints.  Most recent CBC shows normal PLTs but slight leukocytosis.  Patient denies fever, chills, CP, palpitations or COVID related symptoms / exposure.  Her history of thrombocytosis may have been reactive as it has resolved on last two lab tests.

## 2021-04-02 DIAGNOSIS — D47.3 ESSENTIAL (HEMORRHAGIC) THROMBOCYTHEMIA: ICD-10-CM

## 2021-04-09 ENCOUNTER — APPOINTMENT (OUTPATIENT)
Age: 61
End: 2021-04-09

## 2021-07-23 ENCOUNTER — OUTPATIENT (OUTPATIENT)
Dept: OUTPATIENT SERVICES | Facility: HOSPITAL | Age: 61
LOS: 1 days | Discharge: HOME | End: 2021-07-23

## 2021-07-23 ENCOUNTER — APPOINTMENT (OUTPATIENT)
Dept: HEMATOLOGY ONCOLOGY | Facility: CLINIC | Age: 61
End: 2021-07-23
Payer: MEDICAID

## 2021-07-23 ENCOUNTER — LABORATORY RESULT (OUTPATIENT)
Age: 61
End: 2021-07-23

## 2021-07-23 VITALS
WEIGHT: 148 LBS | RESPIRATION RATE: 16 BRPM | TEMPERATURE: 98.2 F | BODY MASS INDEX: 27.23 KG/M2 | HEIGHT: 62 IN | DIASTOLIC BLOOD PRESSURE: 67 MMHG | SYSTOLIC BLOOD PRESSURE: 125 MMHG

## 2021-07-23 LAB
HCT VFR BLD CALC: 39.6 %
HGB BLD-MCNC: 13.8 G/DL
MCHC RBC-ENTMCNC: 29.4 PG
MCHC RBC-ENTMCNC: 34.8 G/DL
MCV RBC AUTO: 84.3 FL
PLATELET # BLD AUTO: 365 K/UL
PMV BLD: 9.7 FL
RBC # BLD: 4.7 M/UL
RBC # FLD: 13.6 %
WBC # FLD AUTO: 15.04 K/UL

## 2021-07-23 PROCEDURE — 99213 OFFICE O/P EST LOW 20 MIN: CPT

## 2021-07-23 NOTE — CONSULT LETTER
[Dear  ___] : Dear  [unfilled], [Consult Letter:] : I had the pleasure of evaluating your patient, [unfilled]. [Please see my note below.] : Please see my note below. [Consult Closing:] : Thank you very much for allowing me to participate in the care of this patient.  If you have any questions, please do not hesitate to contact me. [Sincerely,] : Sincerely, [FreeTextEntry2] : David Kraft Dr [FreeTextEntry3] : Ab Gramajo DO\par Attending Physician,\par Hematology/ Medical Oncology\par [phone] office\par \par

## 2021-07-23 NOTE — PHYSICAL EXAM
[Fully active, able to carry on all pre-disease performance without restriction] : Status 0 - Fully active, able to carry on all pre-disease performance without restriction [Normal] : affect appropriate [de-identified] : telehealth visit; daughter is present

## 2021-07-23 NOTE — HISTORY OF PRESENT ILLNESS
[Cardiovascular] : Cardiovascular [Constitutional] : Constitutional [ENT] : ENT [Dermatologic] : Dermatologic [Endocrine] : Endocrine [Gastrointestinal] : Gastrointestinal [Genitourinary] : Genitourinary [Gynecologic] : Gynecologic [Infectious] : Infectious [Musculoskeletal] : Musculoskeletal [Neurologic] : Neurologic [Pain] : Pain [Pulmonary] : Pulmonary [Hematologic] : Hematologic [de-identified] : 2/21/2020\par Patient is here for a follow-up visit for thrombocytosis.  She is feeling well with no new complaints.  Reviewed most recent CBC shows persistent leukocytosis at 13.01 and PLTs at 413,000 from today.  Patient denies fever, chills, nausea, vomiting, new CP or bleeding.  US Abd was not yet performed.  She takes baby ASA daily.  \par \par 7/24/2020\par Patient is here for a follow-up visit for thrombocytosis.  She is feeling well with no new complaints.  Reviewed most recent CBC from last week shows persistent leukocytosis at 11.76 and PLTs at 415,000 from today.  Patient denies fever, chills, nausea, acute changes in vision, new CP or bleeding.  US Abd reviewed from March 2020 and was normal.  She takes baby ASA daily.   She states she developed nonerythematous right calf pain, worse when walking, which she went to urgent care and they instructed her to go to ER but she did not.  She states she applied ice and says the swelling improved slightly but not completely.  \par US Abd (3.3.2020) IMPRESSION: \par Unremarkable abdominal ultrasound.\par \par EXAM:  US DPLX LWR EXT VEINS COMPL BI      \par \par \par PROCEDURE DATE:  07/24/2020  \par \par \par \par \par INTERPRETATION:  CLINICAL HISTORY / REASON FOR EXAM: Right leg swelling and pain.\par \par TECHNIQUE: Sonographic evaluation of the bilateral lower extremity deep venous system was performed using gray scale, color doppler, and augmentation.\par \par COMPARISON: None.\par \par FINDINGS:\par The bilateral common femoral, greater saphenous, femoral, and popliteal veins were visualized with no evidence of deep venous thrombosis.\par \par All veins were fully compressible. There was presence of spontaneous flow, augmentation with distal compression and phasicity. \par \par The anterior tibial, posterior tibial and peroneal veins were patent.\par \par Right popliteal fossa cyst measuring 2.6 x 2.0 x 0.6 cm with internal echoes, which represent hemorrhage or debris.\par \par IMPRESSION:\par \par No evidence of deep venous thrombosis in either lower extremity. \par \par Right popliteal fossa cyst measuring 2.6 x 2.0 x 0.6 cm with internal echoes, which may represent hemorrhage or debris.\par \par \par \par \par \par \par MORA CORDOVA M.D., ATTENDING RADIOLOGIST\par This document has been electronically signed. Jul 24 2020  1:32PM\par   \par \par   \par \par \par [id number]^[place]^DC\par \par \par 3/26/21\par Patient is here for a follow-up visit for thrombocytosis.  She is feeling well with no new complaints.  Most recent CBC shows normal PLTs but slight leukocytosis.  Patient denies fever, chills, CP, palpitations or COVID related symptoms / exposure.  Her history of thrombocytosis may have been reactive as it has resolved on last two lab tests.   [de-identified] : Ms. Siena Bagley is a 59 year old female here today for evaluation of high PLTs.\par \par She was referred by Dr. Kraft.  Patient is a 58yo F with PMH including htn and anxiety who presents for thrombocytosis.  She states she has been evaluated in their office for about 15 years but this is the first time she was made aware of the elevated PLTs.  Patient denies any new headaches, CP, pruritus, changes in vision or bleeding.  \par \par LAB WORKUP\par (12.7.2019) WBC 8.4, Hgb 14.4, MCV 84.7, RDW 13.1, , , HDL 67, normal CMP,  A1c 5.4%\par (03.02.2018) WBC 7.8, Hgb 14.1, MCV 86.2, RDW 14, \par \par reviewed the labs from 2009: platelets count was 484K AND no anemia or leukopenia

## 2021-07-26 DIAGNOSIS — D47.3 ESSENTIAL (HEMORRHAGIC) THROMBOCYTHEMIA: ICD-10-CM

## 2021-10-05 ENCOUNTER — RX RENEWAL (OUTPATIENT)
Age: 61
End: 2021-10-05

## 2021-10-06 PROBLEM — I10 ESSENTIAL HYPERTENSION: Status: ACTIVE | Noted: 2020-01-22

## 2022-04-12 ENCOUNTER — RX RENEWAL (OUTPATIENT)
Age: 62
End: 2022-04-12

## 2022-04-12 RX ORDER — ASPIRIN ENTERIC COATED TABLETS 81 MG 81 MG/1
81 TABLET, DELAYED RELEASE ORAL DAILY
Qty: 30 | Refills: 3 | Status: ACTIVE | COMMUNITY
Start: 2020-01-31 | End: 1900-01-01

## 2022-05-27 ENCOUNTER — APPOINTMENT (OUTPATIENT)
Dept: HEMATOLOGY ONCOLOGY | Facility: CLINIC | Age: 62
End: 2022-05-27
Payer: COMMERCIAL

## 2022-05-27 ENCOUNTER — LABORATORY RESULT (OUTPATIENT)
Age: 62
End: 2022-05-27

## 2022-05-27 VITALS
HEIGHT: 62 IN | BODY MASS INDEX: 26.87 KG/M2 | RESPIRATION RATE: 18 BRPM | SYSTOLIC BLOOD PRESSURE: 143 MMHG | DIASTOLIC BLOOD PRESSURE: 83 MMHG | HEART RATE: 74 BPM | TEMPERATURE: 98.3 F | WEIGHT: 146 LBS

## 2022-05-27 LAB
HCT VFR BLD CALC: 41.1 %
HGB BLD-MCNC: 14.2 G/DL
MCHC RBC-ENTMCNC: 29.5 PG
MCHC RBC-ENTMCNC: 34.5 G/DL
MCV RBC AUTO: 85.4 FL
PLATELET # BLD AUTO: 454 K/UL
PMV BLD: 8.5 FL
RBC # BLD: 4.81 M/UL
RBC # FLD: 14.3 %
WBC # FLD AUTO: 12.34 K/UL

## 2022-05-27 PROCEDURE — 99213 OFFICE O/P EST LOW 20 MIN: CPT

## 2022-05-27 NOTE — CONSULT LETTER
[Dear  ___] : Dear  [unfilled], [Consult Letter:] : I had the pleasure of evaluating your patient, [unfilled]. [Please see my note below.] : Please see my note below. [Consult Closing:] : Thank you very much for allowing me to participate in the care of this patient.  If you have any questions, please do not hesitate to contact me. [Sincerely,] : Sincerely, [FreeTextEntry2] : David Kraft Dr [FreeTextEntry3] : Ab Gramajo DO\par Attending Physician,\par Hematology/ Medical Oncology\par [phone] office\par \par

## 2022-05-27 NOTE — ASSESSMENT
[FreeTextEntry1] : 60 yo woman with Thrombocytosis at 500K stable since at least 2009; no evidence of bleeding or thrombosis; no leukocytosis or erythrocytosis; intermediate risk based on age >60, no JAK2 expression or thrombosis\par bcr-abl not detected; calr and jak2 neg; MPL negative.  no iron deficiency to explain thrombocytosis\par - US Abd  : NO FATTY LIVER; no splenomegaly\par - there is a possibility of triple negative (JAK2/ MPL/ CALR) Essential thrombocytosis; if that is the case , she has intermediate risk of progression to leukemia, but does not yet require hydrea\par - FISH to r/o MDS w/ 5q deletion by blood showed no evidence of abnormality\par - PLTs pending\par - c/w baby ASA daily\par \par #R calf pain and swelling, worse when walking; onset 2 days ago\par - she was instructed to go to ER yesterday by Urgent Care\par - SONO of legs showed a cyst in popliteal fossa: referred to [facility]ho stat; discussed w pMD\par \par Will continue to monitor slight leukocytosis, she is asymptomatic.  \par labs today \par f/u next week

## 2022-05-27 NOTE — PHYSICAL EXAM
[Fully active, able to carry on all pre-disease performance without restriction] : Status 0 - Fully active, able to carry on all pre-disease performance without restriction [Normal] : affect appropriate [de-identified] : telehealth visit; daughter is present

## 2022-05-27 NOTE — HISTORY OF PRESENT ILLNESS
[de-identified] : Ms. Siena Bagley is a 59 year old female here today for evaluation of high PLTs.\par \par She was referred by Dr. Kraft.  Patient is a 58yo F with PMH including htn and anxiety who presents for thrombocytosis.  She states she has been evaluated in their office for about 15 years but this is the first time she was made aware of the elevated PLTs.  Patient denies any new headaches, CP, pruritus, changes in vision or bleeding.  \par \par LAB WORKUP\par (12.7.2019) WBC 8.4, Hgb 14.4, MCV 84.7, RDW 13.1, , , HDL 67, normal CMP,  A1c 5.4%\par (03.02.2018) WBC 7.8, Hgb 14.1, MCV 86.2, RDW 14, \par \par reviewed the labs from 2009: platelets count was 484K AND no anemia or leukopenia [de-identified] : 2/21/2020\par Patient is here for a follow-up visit for thrombocytosis.  She is feeling well with no new complaints.  Reviewed most recent CBC shows persistent leukocytosis at 13.01 and PLTs at 413,000 from today.  Patient denies fever, chills, nausea, vomiting, new CP or bleeding.  US Abd was not yet performed.  She takes baby ASA daily.  \par \par 7/24/2020\par Patient is here for a follow-up visit for thrombocytosis.  She is feeling well with no new complaints.  Reviewed most recent CBC from last week shows persistent leukocytosis at 11.76 and PLTs at 415,000 from today.  Patient denies fever, chills, nausea, acute changes in vision, new CP or bleeding.  US Abd reviewed from March 2020 and was normal.  She takes baby ASA daily.   She states she developed nonerythematous right calf pain, worse when walking, which she went to urgent care and they instructed her to go to ER but she did not.  She states she applied ice and says the swelling improved slightly but not completely.  \par US Abd (3.3.2020) IMPRESSION: \par Unremarkable abdominal ultrasound.\par \par EXAM:  US DPLX LWR EXT VEINS COMPL BI      \par \par \par PROCEDURE DATE:  07/24/2020  \par \par \par \par \par INTERPRETATION:  CLINICAL HISTORY / REASON FOR EXAM: Right leg swelling and pain.\par \par TECHNIQUE: Sonographic evaluation of the bilateral lower extremity deep venous system was performed using gray scale, color doppler, and augmentation.\par \par COMPARISON: None.\par \par FINDINGS:\par The bilateral common femoral, greater saphenous, femoral, and popliteal veins were visualized with no evidence of deep venous thrombosis.\par \par All veins were fully compressible. There was presence of spontaneous flow, augmentation with distal compression and phasicity. \par \par The anterior tibial, posterior tibial and peroneal veins were patent.\par \par Right popliteal fossa cyst measuring 2.6 x 2.0 x 0.6 cm with internal echoes, which represent hemorrhage or debris.\par \par IMPRESSION:\par \par No evidence of deep venous thrombosis in either lower extremity. \par \par Right popliteal fossa cyst measuring 2.6 x 2.0 x 0.6 cm with internal echoes, which may represent hemorrhage or debris.\par \par 3/26/21\par Patient is here for a follow-up visit for thrombocytosis.  She is feeling well with no new complaints.  Most recent CBC shows normal PLTs but slight leukocytosis.  Patient denies fever, chills, CP, palpitations or COVID related symptoms / exposure.  Her history of thrombocytosis may have been reactive as it has resolved on last two lab tests.  \par \par 5/27/22\par

## 2022-05-31 LAB
ALBUMIN SERPL ELPH-MCNC: 4.5 G/DL
ALP BLD-CCNC: 82 U/L
ALT SERPL-CCNC: 18 U/L
ANION GAP SERPL CALC-SCNC: 15 MMOL/L
AST SERPL-CCNC: 17 U/L
BILIRUB DIRECT SERPL-MCNC: <0.2 MG/DL
BILIRUB INDIRECT SERPL-MCNC: >0.1 MG/DL
BILIRUB SERPL-MCNC: 0.3 MG/DL
BUN SERPL-MCNC: 18 MG/DL
CALCIUM SERPL-MCNC: 9.6 MG/DL
CHLORIDE SERPL-SCNC: 101 MMOL/L
CO2 SERPL-SCNC: 24 MMOL/L
CREAT SERPL-MCNC: 0.7 MG/DL
EGFR: 97 ML/MIN/1.73M2
GLUCOSE SERPL-MCNC: 107 MG/DL
POTASSIUM SERPL-SCNC: 4.4 MMOL/L
PROT SERPL-MCNC: 7.6 G/DL
SODIUM SERPL-SCNC: 140 MMOL/L

## 2022-06-02 ENCOUNTER — APPOINTMENT (OUTPATIENT)
Dept: HEMATOLOGY ONCOLOGY | Facility: CLINIC | Age: 62
End: 2022-06-02
Payer: COMMERCIAL

## 2022-06-02 ENCOUNTER — OUTPATIENT (OUTPATIENT)
Dept: OUTPATIENT SERVICES | Facility: HOSPITAL | Age: 62
LOS: 1 days | Discharge: HOME | End: 2022-06-02

## 2022-06-02 DIAGNOSIS — D75.839 THROMBOCYTOSIS, UNSPECIFIED: ICD-10-CM

## 2022-06-02 PROCEDURE — 99213 OFFICE O/P EST LOW 20 MIN: CPT | Mod: 95

## 2022-06-02 NOTE — CONSULT LETTER
[Dear  ___] : Dear  [unfilled], [Consult Letter:] : I had the pleasure of evaluating your patient, [unfilled]. [Please see my note below.] : Please see my note below. [Consult Closing:] : Thank you very much for allowing me to participate in the care of this patient.  If you have any questions, please do not hesitate to contact me. [Sincerely,] : Sincerely, [FreeTextEntry2] : David Kraft Dr [FreeTextEntry3] : Ab Gramajo DO\par Attending Physician,\par Hematology/ Medical Oncology\par [phone] office\par \par

## 2022-06-02 NOTE — REASON FOR VISIT
[Follow-Up Visit] : a follow-up visit for [Thrombocytosis] : thrombocytosis [Home] : at home, [unfilled] , at the time of the visit. [Medical Office: ([facility])___] : at the medical office located in  [Patient] : the patient [FreeTextEntry2] : Julian

## 2022-06-02 NOTE — HISTORY OF PRESENT ILLNESS
[de-identified] : Ms. Siena Bagley is a 59 year old female here today for evaluation of high PLTs.\par \par She was referred by Dr. Kraft.  Patient is a 60yo F with PMH including htn and anxiety who presents for thrombocytosis.  She states she has been evaluated in their office for about 15 years but this is the first time she was made aware of the elevated PLTs.  Patient denies any new headaches, CP, pruritus, changes in vision or bleeding.  \par \par LAB WORKUP\par (12.7.2019) WBC 8.4, Hgb 14.4, MCV 84.7, RDW 13.1, , , HDL 67, normal CMP,  A1c 5.4%\par (03.02.2018) WBC 7.8, Hgb 14.1, MCV 86.2, RDW 14, \par \par reviewed the labs from 2009: platelets count was 484K AND no anemia or leukopenia [de-identified] : 2/21/2020\par Patient is here for a follow-up visit for thrombocytosis.  She is feeling well with no new complaints.  Reviewed most recent CBC shows persistent leukocytosis at 13.01 and PLTs at 413,000 from today.  Patient denies fever, chills, nausea, vomiting, new CP or bleeding.  US Abd was not yet performed.  She takes baby ASA daily.  \par \par 7/24/2020\par Patient is here for a follow-up visit for thrombocytosis.  She is feeling well with no new complaints.  Reviewed most recent CBC from last week shows persistent leukocytosis at 11.76 and PLTs at 415,000 from today.  Patient denies fever, chills, nausea, acute changes in vision, new CP or bleeding.  US Abd reviewed from March 2020 and was normal.  She takes baby ASA daily.   She states she developed nonerythematous right calf pain, worse when walking, which she went to urgent care and they instructed her to go to ER but she did not.  She states she applied ice and says the swelling improved slightly but not completely.  \par US Abd (3.3.2020) IMPRESSION: \par Unremarkable abdominal ultrasound.\par \par EXAM:  US DPLX LWR EXT VEINS COMPL BI      \par \par \par PROCEDURE DATE:  07/24/2020  \par \par \par \par \par INTERPRETATION:  CLINICAL HISTORY / REASON FOR EXAM: Right leg swelling and pain.\par \par TECHNIQUE: Sonographic evaluation of the bilateral lower extremity deep venous system was performed using gray scale, color doppler, and augmentation.\par \par COMPARISON: None.\par \par FINDINGS:\par The bilateral common femoral, greater saphenous, femoral, and popliteal veins were visualized with no evidence of deep venous thrombosis.\par \par All veins were fully compressible. There was presence of spontaneous flow, augmentation with distal compression and phasicity. \par \par The anterior tibial, posterior tibial and peroneal veins were patent.\par \par Right popliteal fossa cyst measuring 2.6 x 2.0 x 0.6 cm with internal echoes, which represent hemorrhage or debris.\par \par IMPRESSION:\par \par No evidence of deep venous thrombosis in either lower extremity. \par \par Right popliteal fossa cyst measuring 2.6 x 2.0 x 0.6 cm with internal echoes, which may represent hemorrhage or debris.\par \par 3/26/21\par Patient is here for a follow-up visit for thrombocytosis.  She is feeling well with no new complaints.  Most recent CBC shows normal PLTs but slight leukocytosis.  Patient denies fever, chills, CP, palpitations or COVID related symptoms / exposure.  Her history of thrombocytosis may have been reactive as it has resolved on last two lab tests.  \par \par 5/27/22\par

## 2022-06-02 NOTE — ASSESSMENT
[FreeTextEntry1] : 62 yo woman with Thrombocytosis at 500K stable since at least 2009; no evidence of bleeding or thrombosis; no leukocytosis or erythrocytosis; intermediate risk based on age >60, no JAK2 expression or thrombosis\par bcr-abl not detected; calr and jak2 neg; MPL negative.  no iron deficiency to explain thrombocytosis\par - US Abd  : NO FATTY LIVER; no splenomegaly\par - there is a possibility of triple negative (JAK2/ MPL/ CALR) Essential thrombocytosis; if that is the case , she has intermediate risk of progression to leukemia, but does not yet require hydrea\par - FISH to r/o MDS w/ 5q deletion by blood showed no evidence of abnormality\par - PLTs pending\par - c/w baby ASA daily\par \par #R calf pain and swelling, worse when walking; onset 2 days ago\par - she was instructed to go to ER yesterday by Urgent Care\par - SONO of legs showed a cyst in popliteal fossa: referred to [facility]ho stat; discussed w pMD\par \par thrombocytosis is chronic, stable, mild and asymptomatic; she is 62 yo woman with no mutations found, which places her at intermiediate risk: would not recommend hydrea ; continue ASA; f/u given

## 2022-11-07 ENCOUNTER — APPOINTMENT (OUTPATIENT)
Dept: HEMATOLOGY ONCOLOGY | Facility: CLINIC | Age: 62
End: 2022-11-07

## 2022-11-08 LAB
ALBUMIN SERPL ELPH-MCNC: 4.5 G/DL
ALP BLD-CCNC: 83 U/L
ALT SERPL-CCNC: 12 U/L
ANION GAP SERPL CALC-SCNC: 9 MMOL/L
AST SERPL-CCNC: 16 U/L
BASOPHILS # BLD AUTO: 0.05 K/UL
BASOPHILS NFR BLD AUTO: 0.3 %
BILIRUB DIRECT SERPL-MCNC: <0.2 MG/DL
BILIRUB INDIRECT SERPL-MCNC: NORMAL MG/DL
BILIRUB SERPL-MCNC: 0.4 MG/DL
BUN SERPL-MCNC: 17 MG/DL
CALCIUM SERPL-MCNC: 9.5 MG/DL
CHLORIDE SERPL-SCNC: 100 MMOL/L
CO2 SERPL-SCNC: 27 MMOL/L
CREAT SERPL-MCNC: 0.7 MG/DL
EGFR: 97 ML/MIN/1.73M2
EOSINOPHIL # BLD AUTO: 0.28 K/UL
EOSINOPHIL NFR BLD AUTO: 1.9 %
GLUCOSE SERPL-MCNC: 91 MG/DL
HCT VFR BLD CALC: 40 %
HGB BLD-MCNC: 13.5 G/DL
IMM GRANULOCYTES NFR BLD AUTO: 0.3 %
LYMPHOCYTES # BLD AUTO: 2.36 K/UL
LYMPHOCYTES NFR BLD AUTO: 16 %
MAN DIFF?: NORMAL
MCHC RBC-ENTMCNC: 29 PG
MCHC RBC-ENTMCNC: 33.8 G/DL
MCV RBC AUTO: 85.8 FL
MONOCYTES # BLD AUTO: 1 K/UL
MONOCYTES NFR BLD AUTO: 6.8 %
NEUTROPHILS # BLD AUTO: 11 K/UL
NEUTROPHILS NFR BLD AUTO: 74.7 %
PLATELET # BLD AUTO: 502 K/UL
POTASSIUM SERPL-SCNC: 4.1 MMOL/L
PROT SERPL-MCNC: 7.3 G/DL
RBC # BLD: 4.66 M/UL
RBC # FLD: 13.4 %
SODIUM SERPL-SCNC: 136 MMOL/L
WBC # FLD AUTO: 14.73 K/UL

## 2022-11-10 ENCOUNTER — OUTPATIENT (OUTPATIENT)
Dept: OUTPATIENT SERVICES | Facility: HOSPITAL | Age: 62
LOS: 1 days | End: 2022-11-10

## 2022-11-10 ENCOUNTER — APPOINTMENT (OUTPATIENT)
Dept: HEMATOLOGY ONCOLOGY | Facility: CLINIC | Age: 62
End: 2022-11-10

## 2022-11-10 DIAGNOSIS — D75.839 THROMBOCYTOSIS, UNSPECIFIED: ICD-10-CM

## 2022-11-10 PROCEDURE — 99213 OFFICE O/P EST LOW 20 MIN: CPT | Mod: 95

## 2022-11-10 NOTE — HISTORY OF PRESENT ILLNESS
[de-identified] : 2/21/2020\par Patient is here for a follow-up visit for thrombocytosis.  She is feeling well with no new complaints.  Reviewed most recent CBC shows persistent leukocytosis at 13.01 and PLTs at 413,000 from today.  Patient denies fever, chills, nausea, vomiting, new CP or bleeding.  US Abd was not yet performed.  She takes baby ASA daily.  \par \par 7/24/2020\par Patient is here for a follow-up visit for thrombocytosis.  She is feeling well with no new complaints.  Reviewed most recent CBC from last week shows persistent leukocytosis at 11.76 and PLTs at 415,000 from today.  Patient denies fever, chills, nausea, acute changes in vision, new CP or bleeding.  US Abd reviewed from March 2020 and was normal.  She takes baby ASA daily.   She states she developed nonerythematous right calf pain, worse when walking, which she went to urgent care and they instructed her to go to ER but she did not.  She states she applied ice and says the swelling improved slightly but not completely.  \par US Abd (3.3.2020) IMPRESSION: \par Unremarkable abdominal ultrasound.\par \par EXAM:  US DPLX LWR EXT VEINS COMPL BI      \par \par \par PROCEDURE DATE:  07/24/2020  \par \par \par \par \par INTERPRETATION:  CLINICAL HISTORY / REASON FOR EXAM: Right leg swelling and pain.\par \par TECHNIQUE: Sonographic evaluation of the bilateral lower extremity deep venous system was performed using gray scale, color doppler, and augmentation.\par \par COMPARISON: None.\par \par FINDINGS:\par The bilateral common femoral, greater saphenous, femoral, and popliteal veins were visualized with no evidence of deep venous thrombosis.\par \par All veins were fully compressible. There was presence of spontaneous flow, augmentation with distal compression and phasicity. \par \par The anterior tibial, posterior tibial and peroneal veins were patent.\par \par Right popliteal fossa cyst measuring 2.6 x 2.0 x 0.6 cm with internal echoes, which represent hemorrhage or debris.\par \par IMPRESSION:\par \par No evidence of deep venous thrombosis in either lower extremity. \par \par Right popliteal fossa cyst measuring 2.6 x 2.0 x 0.6 cm with internal echoes, which may represent hemorrhage or debris.\par \par 3/26/21\par Patient is here for a follow-up visit for thrombocytosis.  She is feeling well with no new complaints.  Most recent CBC shows normal PLTs but slight leukocytosis.  Patient denies fever, chills, CP, palpitations or COVID related symptoms / exposure.  Her history of thrombocytosis may have been reactive as it has resolved on last two lab tests.  \par \par 5/27/22\par  [de-identified] : Ms. Siena Bagley is a 59 year old female here today for evaluation of high PLTs.\par \par She was referred by Dr. Kraft.  Patient is a 60yo F with PMH including htn and anxiety who presents for thrombocytosis.  She states she has been evaluated in their office for about 15 years but this is the first time she was made aware of the elevated PLTs.  Patient denies any new headaches, CP, pruritus, changes in vision or bleeding.  \par \par LAB WORKUP\par (12.7.2019) WBC 8.4, Hgb 14.4, MCV 84.7, RDW 13.1, , , HDL 67, normal CMP,  A1c 5.4%\par (03.02.2018) WBC 7.8, Hgb 14.1, MCV 86.2, RDW 14, \par \par reviewed the labs from 2009: platelets count was 484K AND no anemia or leukopenia

## 2022-11-10 NOTE — REASON FOR VISIT
[Follow-Up Visit] : a follow-up visit for [Thrombocytosis] : thrombocytosis [Home] : at home, [unfilled] , at the time of the visit. [Medical Office: ([facility])___] : at the medical office located in  [Patient] : the patient [FreeTextEntry2] : Julian

## 2022-11-10 NOTE — CONSULT LETTER
[Dear  ___] : Dear  [unfilled], [Consult Letter:] : I had the pleasure of evaluating your patient, [unfilled]. [Please see my note below.] : Please see my note below. [Consult Closing:] : Thank you very much for allowing me to participate in the care of this patient.  If you have any questions, please do not hesitate to contact me. [Sincerely,] : Sincerely, [FreeTextEntry2] : David Kraft Dr [FreeTextEntry3] : Ab Gramajo DO\par Attending Physician,\par Hematology/ Medical Oncology\par [phone] office\par \par

## 2022-11-10 NOTE — ASSESSMENT
[FreeTextEntry1] : 63 yo woman with Thrombocytosis at 500K stable since at least 2009; no evidence of bleeding or thrombosis; no leukocytosis or erythrocytosis; intermediate risk based on age >60, no JAK2 expression or thrombosis\par bcr-abl not detected; calr and jak2 neg; MPL negative.  no iron deficiency to explain thrombocytosis\par - US Abd  : NO FATTY LIVER; no splenomegaly\par - there is a possibility of triple negative (JAK2/ MPL/ CALR) Essential thrombocytosis; if that is the case , she has intermediate risk of progression to leukemia, but does not yet require hydrea\par - FISH to r/o MDS w/ 5q deletion by blood showed no evidence of abnormality\par - PLTs pending\par - c/w baby ASA daily\par \par #R calf pain and swelling, worse when walking; onset 2 days ago\par - she was instructed to go to ER yesterday by Urgent Care\par - SONO of legs showed a cyst in popliteal fossa: referred to [facility]ho stat; discussed w pMD\par \par thrombocytosis is chronic, stable, mild and asymptomatic; she is 63 yo woman with no mutations found, which places her at intermiediate risk: would not recommend hydrea ; continue ASA; f/u given 3 months

## 2023-02-08 ENCOUNTER — APPOINTMENT (OUTPATIENT)
Age: 63
End: 2023-02-08

## 2023-02-08 ENCOUNTER — APPOINTMENT (OUTPATIENT)
Dept: HEMATOLOGY ONCOLOGY | Facility: CLINIC | Age: 63
End: 2023-02-08

## 2025-04-04 NOTE — ASSESSMENT
[FreeTextEntry1] : 58 yo woman with Thrombocytosis at 500K stable since at least 2009; no evidence of bleeding or thrombosis; no leukocytosis or erythrocytosis; intermediate risk based on age >60, no JAK2 expression or thrombosis\par bcr-abl not detected; calr and jak2 neg; MPL negative\par - US Abd ordered to r/o fatty liver not done yet; she was reminded\par - there is a possibility of triple negative (JAK2/ MPL/ CALR) Essential thrombocytosis; if that is the case , she has intermediate risk of progression to leukemia, but does not yet require hydrea\par - will keep monitoring levels\par - c/w baby ASA daily\par \par no iron deficiency to explain thrombocytosis\par \par Will try to r/o MDS w/ 5q deletion by blood w/u; not done with previous labs; otherwise will consider bone marrow\par ultrasound of abdomen\par RTC in 2 months with CBC
show